# Patient Record
Sex: MALE | Race: OTHER | HISPANIC OR LATINO | URBAN - METROPOLITAN AREA
[De-identification: names, ages, dates, MRNs, and addresses within clinical notes are randomized per-mention and may not be internally consistent; named-entity substitution may affect disease eponyms.]

---

## 2020-07-09 ENCOUNTER — OUTPATIENT (OUTPATIENT)
Dept: OUTPATIENT SERVICES | Facility: HOSPITAL | Age: 40
LOS: 1 days | End: 2020-07-09

## 2020-07-10 LAB — SARS-COV-2 RNA SPEC QL NAA+PROBE: SIGNIFICANT CHANGE UP

## 2022-03-25 ENCOUNTER — EMERGENCY (EMERGENCY)
Facility: HOSPITAL | Age: 42
LOS: 1 days | Discharge: HOME | End: 2022-03-25
Attending: EMERGENCY MEDICINE | Admitting: EMERGENCY MEDICINE
Payer: MEDICAID

## 2022-03-25 VITALS
TEMPERATURE: 98 F | HEART RATE: 101 BPM | RESPIRATION RATE: 20 BRPM | OXYGEN SATURATION: 97 % | DIASTOLIC BLOOD PRESSURE: 66 MMHG | SYSTOLIC BLOOD PRESSURE: 123 MMHG

## 2022-03-25 VITALS
TEMPERATURE: 98 F | OXYGEN SATURATION: 99 % | HEART RATE: 98 BPM | SYSTOLIC BLOOD PRESSURE: 116 MMHG | DIASTOLIC BLOOD PRESSURE: 80 MMHG | RESPIRATION RATE: 20 BRPM

## 2022-03-25 PROCEDURE — 99284 EMERGENCY DEPT VISIT MOD MDM: CPT

## 2022-03-25 PROCEDURE — 71045 X-RAY EXAM CHEST 1 VIEW: CPT | Mod: 26

## 2022-03-25 RX ORDER — IPRATROPIUM/ALBUTEROL SULFATE 18-103MCG
3 AEROSOL WITH ADAPTER (GRAM) INHALATION
Refills: 0 | Status: COMPLETED | OUTPATIENT
Start: 2022-03-25 | End: 2022-03-25

## 2022-03-25 RX ORDER — MAGNESIUM SULFATE 500 MG/ML
2 VIAL (ML) INJECTION ONCE
Refills: 0 | Status: COMPLETED | OUTPATIENT
Start: 2022-03-25 | End: 2022-03-25

## 2022-03-25 RX ADMIN — Medication 50 MILLIGRAM(S): at 01:52

## 2022-03-25 RX ADMIN — Medication 3 MILLILITER(S): at 03:32

## 2022-03-25 RX ADMIN — Medication 200 MILLIGRAM(S): at 02:29

## 2022-03-25 RX ADMIN — Medication 150 GRAM(S): at 03:39

## 2022-03-25 RX ADMIN — Medication 3 MILLILITER(S): at 01:56

## 2022-03-25 RX ADMIN — Medication 3 MILLILITER(S): at 02:23

## 2022-03-25 NOTE — ED PROVIDER NOTE - PATIENT PORTAL LINK FT
You can access the FollowMyHealth Patient Portal offered by [facility] by registering at the following website: [email or web address]. By joining [employer]’s FollowMyHealth portal, you will also be able to view your health information using other applications (apps) compatible with our system.

## 2022-03-25 NOTE — ED PROVIDER NOTE - PHYSICAL EXAMINATION
PHYSICAL EXAM:  GENERAL: NAD, well-groomed, well-developed  HEAD:  Atraumatic, Normocephalic  EYES: EOMI, PERRLA, conjunctiva and sclera clear  ENMT: No tonsillar erythema, exudates, or enlargement; Moist mucous membranes, No lesions  NECK: Supple, No JVD, Normal thyroid  CHEST/LUNG: Diffuse inspiratory and expiratory wheezes  HEART: Regular rate and rhythm; No murmurs, rubs, or gallops  ABDOMEN: Soft, Nontender, Nondistended; Bowel sounds present  VASCULAR:  2+ Peripheral Pulses, No clubbing, cyanosis, or edema  LYMPH: No lymphadenopathy noted  SKIN: No rashes or lesions  NERVOUS SYSTEM:  Alert & Oriented X3; Motor Strength 5/5 B/L upper and lower extremities

## 2022-03-25 NOTE — ED PROVIDER NOTE - NSFOLLOWUPCLINICS_GEN_ALL_ED_FT
[facility] Pulmonolgy and Sleep Medicine  Pulmonology  [address] [place] [address], [place] [address]  Phone: [phone]  Fax:

## 2022-03-25 NOTE — ED PROVIDER NOTE - CLINICAL SUMMARY MEDICAL DECISION MAKING FREE TEXT BOX
42 M, current smoker, presenting with 2 weeks of intermittent cough and SOB. Patient with no formal diagnosis of asthma or COPD. No recent fevers, chills, runny nose, or sore throat. Patient able to speak in full sentences and saturating well. Vitals stable. On exam, diffuse inspiratory and expiratory wheezing auscultated. Will treat as asthma exacerbation possibly 2/2 viral illness. Will give duonebs and prednisone, check RVP and CXR, and then reassess.

## 2022-03-25 NOTE — ED ADULT TRIAGE NOTE - CHIEF COMPLAINT QUOTE
Patient experiencing SOB, wheezing, congestion and cough for 2 weeks. He was seen by his PCP and given an albuterol inhaler without relief. Patient was in L&D with his wife when he started feeling SOB, used his inhaler without relief. Respirations equal and unlabored. Speaking in full sentences.

## 2022-03-25 NOTE — ED PROVIDER NOTE - NSPTACCESSSVCSAPPTDETAILS_ED_ALL_ED_FT
Reactive airway with wheezing, cough, treated as asthma exacerbation. Active smoker. Not formerly diagnosed with asthma or COPD.

## 2022-03-25 NOTE — ED PROVIDER NOTE - NS ED ROS FT
REVIEW OF SYSTEMS:  CONSTITUTIONAL: No fever, chills, night sweats, or fatigue  EYES: No eye pain, visual disturbances, or discharge  ENMT:  No difficulty hearing, tinnitus, vertigo; No sinus or throat pain  NECK: No pain or stiffness  RESPIRATORY: + dry cough, + wheezing, + SOB when coughing. No hemoptysis  CARDIOVASCULAR: No chest pain, palpitations, dizziness, or leg swelling  GASTROINTESTINAL: No abdominal or epigastric pain. No nausea, vomiting, or hematemesis; No diarrhea or constipation. No melena or hematochezia.  GENITOURINARY: No dysuria, frequency, hematuria, or incontinence  NEUROLOGICAL: No headaches, memory loss, loss of strength, numbness, or tremors  SKIN: No itching, burning, rashes, or lesions   LYMPH NODES: No enlarged glands  MUSCULOSKELETAL: No joint pain or swelling; No muscle, back, or extremity pain  HEME/LYMPH: No easy bruising, or bleeding gums  ALLERGY AND IMMUNOLOGIC: No hives or eczema

## 2022-03-25 NOTE — ED PROVIDER NOTE - ATTENDING CONTRIBUTION TO CARE
I performed a face-to-face evaluation of the patient and performed a history and physical examination along with the resident or ACP, and/or medical student above.  I agree with the history and physical examination as documented by the resident or ACP, and/or medical student above.  Darrell:  41yo M, active daily smoker, denies significant pmh (specifically denies any formal dx of asthma), p/w 2wks intermittent coughing spells (each time a/w sob and chest pain), now a/w wheezing, mildly improved w/ albuterol MDI (recently prescribed by [facility]). Reports having tested negative for covid 4days ago. Denies f/c/sore throat/myalgias/abd pain. No hx of dvt/pe/LE swelling. No PE risk factors. CXR, meds, rvp, reassess.

## 2022-03-25 NOTE — ED ADULT NURSE NOTE - OBJECTIVE STATEMENT
Pt A&Ox4 ambulatory, no PMH, presenting to the ED (RM 20) c/o SOB. Pt states woke up from sleep having difficulty breathing. Pt states having intermittent cough and SOB for 2 weeks. Pt states SOB get worse at night. Endorses cough, HA. Pt states uses albuterol without relief. Denies cp, palpitations, n/v/d, fever, chills. Upon assessment, auditory wheezing noted, respirations are even and labored, able to speak in full sentences, coughing, O2= 99% RA, was put on NC 2L for comfort measures. pt placed on CM= MD MARCOS at bedside for evaluation, safety precautions implemented as per protocol, awaiting further MD orders, will continue to monitor.

## 2022-03-25 NOTE — ED PROVIDER NOTE - NSFOLLOWUPINSTRUCTIONS_ED_ALL_ED_FT
You came to the ED with Shortness of breath and cough. You were treated for an asthma attack.     Follow these instructions at home:  Medicines   •Take over-the-counter and prescription medicines only as told by your health care provider.   •Keep your medicines up-to-date.   •Make sure you have all of your medicines available at all times.  •If you were prescribed an antibiotic medicine, take it as told by your health care provider. Do not stop taking the antibiotic even if you start to feel better.    Avoiding triggers   •Keep track of things that trigger your asthma attacks. Avoid exposure to these triggers.  • Do not use any products that contain nicotine or tobacco, such as cigarettes, e-cigarettes, and chewing tobacco. If you need help quitting, ask your health care provider.  •When there is a lot of pollen, air pollution, or humidity, keep windows closed and use an air conditioner or go to places with air conditioning.      General instructions     •Avoid excessive exercise or activity until your asthma attack goes away. Ask your health care provider what activities are safe for you and when you can return to your normal activities.      •Stay up to date on all your vaccines, such as flu and pneumonia vaccines.      •Drink enough fluid to keep your urine pale yellow. Staying hydrated helps keep mucus in your lungs thin so it can be coughed up easily.      • Do not use alcohol until you have recovered.      •Keep all follow-up visits as told by your health care provider. This is important. Asthma requires careful medical care.        Contact a health care provider if:  •You have followed your action plan for 1 hour and your peak flow reading is still at 50–79%. This is in the yellow zone, which means "caution."  •You need to use your quick reliever medicine more frequently than normal.  •Your medicines are causing side effects, such as rash, itching, swelling, or trouble breathing.  •Your symptoms do not improve after 48 hours.  •You cough up mucus that is thicker than usual.  •You have a fever.    Get help right away if:  •Your peak flow reading is less than 50% of your personal best. This is in the red zone, which means "danger."  •You have trouble breathing.  •You develop chest pain or discomfort.  •Your medicines no longer seem to be helping.  •You are coughing up bloody mucus.  •You have a fever and your symptoms suddenly get worse.  •You have trouble swallowing.  •You feel very tired, and breathing becomes tiring.    Recommendations:  1. Please follow up with your primary care doctor  2. Please follow up with a pulmonologist.   3. Please continue to take prednisone 50mg once a day until complete (3 more days) You came to the ED with Shortness of breath and cough. You were treated for an asthma attack.     Follow these instructions at home:  Medicines   •Take over-the-counter and prescription medicines only as told by your health care provider.   •Keep your medicines up-to-date.   •Make sure you have all of your medicines available at all times.  •If you were prescribed an antibiotic medicine, take it as told by your health care provider. Do not stop taking the antibiotic even if you start to feel better.    Avoiding triggers   •Keep track of things that trigger your asthma attacks. Avoid exposure to these triggers.  • Do not use any products that contain nicotine or tobacco, such as cigarettes, e-cigarettes, and chewing tobacco. If you need help quitting, ask your health care provider.  •When there is a lot of pollen, air pollution, or humidity, keep windows closed and use an air conditioner or go to places with air conditioning.      General instructions     •Avoid excessive exercise or activity until your asthma attack goes away. Ask your health care provider what activities are safe for you and when you can return to your normal activities.      •Stay up to date on all your vaccines, such as flu and pneumonia vaccines.      •Drink enough fluid to keep your urine pale yellow. Staying hydrated helps keep mucus in your lungs thin so it can be coughed up easily.      • Do not use alcohol until you have recovered.      •Keep all follow-up visits as told by your health care provider. This is important. Asthma requires careful medical care.        Contact a health care provider if:  •You have followed your action plan for 1 hour and your peak flow reading is still at 50–79%. This is in the yellow zone, which means "caution."  •You need to use your quick reliever medicine more frequently than normal.  •Your medicines are causing side effects, such as rash, itching, swelling, or trouble breathing.  •Your symptoms do not improve after 48 hours.  •You cough up mucus that is thicker than usual.  •You have a fever.    Get help right away if:  •Your peak flow reading is less than 50% of your personal best. This is in the red zone, which means "danger."  •You have trouble breathing.  •You develop chest pain or discomfort.  •Your medicines no longer seem to be helping.  •You are coughing up bloody mucus.  •You have a fever and your symptoms suddenly get worse.  •You have trouble swallowing.  •You feel very tired, and breathing becomes tiring.    Recommendations:  1. Please follow up with your primary care doctor  2. Please continue to take prednisone 50mg once a day until complete (3 more days)  3. Please follow up with a pulmonologist. Please expect a phone call from the clinic to schedule an appointment. The clinic info is below:    [facility] Pulmonolgy and Sleep Medicine  Pulmonology  [address] [place] [address], [place] [address]  Phone: [phone]

## 2022-03-25 NOTE — ED PROVIDER NOTE - OBJECTIVE STATEMENT
42 M with no PMHx, but active smoker (4 cigarettes/day since age 17) presenting with 2 weeks of intermittent cough. During coughing episodes, patient feels SOB and endorses some chest pain. Has been prescribed an albuterol inhaler by urgent care which he has been using frequently for SOB. No formal diagnosis of asthma or COPD. Patient takes no other medications but has tried OTC benadryl, robitussin, and mucinex without significant relief. Per patient, he was recently tested for COVID 3-4 days ago and was negative. Cough is dry with minimal sputum production. Denies fever, chills, runny nose, sore throat.

## 2022-03-25 NOTE — ED PROVIDER NOTE - PROGRESS NOTE DETAILS
Joanna, PGY2: Patient still wheezing s/p 2 rounds of duonebs. Will give 3rd round and ordered IV mg 2g. Joanna, PGY2: Patient feels better after IV mg and 3rd duoneb. Will likely d/c with pulm follow up and 3 day course of prednisone.

## 2024-06-25 ENCOUNTER — HOSPITAL ENCOUNTER (EMERGENCY)
Facility: HOSPITAL | Age: 44
Discharge: HOME | End: 2024-06-25
Attending: EMERGENCY MEDICINE

## 2024-06-25 ENCOUNTER — APPOINTMENT (EMERGENCY)
Dept: ULTRASOUND IMAGING | Facility: HOSPITAL | Age: 44
End: 2024-06-25

## 2024-06-25 VITALS
OXYGEN SATURATION: 97 % | RESPIRATION RATE: 18 BRPM | SYSTOLIC BLOOD PRESSURE: 121 MMHG | DIASTOLIC BLOOD PRESSURE: 77 MMHG | TEMPERATURE: 97.9 F | HEART RATE: 81 BPM

## 2024-06-25 DIAGNOSIS — R10.11 RIGHT UPPER QUADRANT ABDOMINAL PAIN: Primary | ICD-10-CM

## 2024-06-25 LAB
ALBUMIN SERPL BCG-MCNC: 4.3 G/DL (ref 3.5–5)
ALP SERPL-CCNC: 43 U/L (ref 34–104)
ALT SERPL W P-5'-P-CCNC: 19 U/L (ref 7–52)
ANION GAP SERPL CALCULATED.3IONS-SCNC: 6 MMOL/L (ref 4–13)
AST SERPL W P-5'-P-CCNC: 44 U/L (ref 13–39)
BASOPHILS # BLD AUTO: 0.04 THOUSANDS/ÂΜL (ref 0–0.1)
BASOPHILS NFR BLD AUTO: 1 % (ref 0–1)
BILIRUB SERPL-MCNC: 0.36 MG/DL (ref 0.2–1)
BUN SERPL-MCNC: 16 MG/DL (ref 5–25)
CALCIUM SERPL-MCNC: 9.1 MG/DL (ref 8.4–10.2)
CHLORIDE SERPL-SCNC: 106 MMOL/L (ref 96–108)
CO2 SERPL-SCNC: 24 MMOL/L (ref 21–32)
CREAT SERPL-MCNC: 0.83 MG/DL (ref 0.6–1.3)
EOSINOPHIL # BLD AUTO: 0.39 THOUSAND/ÂΜL (ref 0–0.61)
EOSINOPHIL NFR BLD AUTO: 5 % (ref 0–6)
ERYTHROCYTE [DISTWIDTH] IN BLOOD BY AUTOMATED COUNT: 13.2 % (ref 11.6–15.1)
GFR SERPL CREATININE-BSD FRML MDRD: 106 ML/MIN/1.73SQ M
GLUCOSE SERPL-MCNC: 86 MG/DL (ref 65–140)
HCT VFR BLD AUTO: 43 % (ref 36.5–49.3)
HGB BLD-MCNC: 15.4 G/DL (ref 12–17)
IMM GRANULOCYTES # BLD AUTO: 0.02 THOUSAND/UL (ref 0–0.2)
IMM GRANULOCYTES NFR BLD AUTO: 0 % (ref 0–2)
LIPASE SERPL-CCNC: 21 U/L (ref 11–82)
LYMPHOCYTES # BLD AUTO: 2.02 THOUSANDS/ÂΜL (ref 0.6–4.47)
LYMPHOCYTES NFR BLD AUTO: 26 % (ref 14–44)
MCH RBC QN AUTO: 31.4 PG (ref 26.8–34.3)
MCHC RBC AUTO-ENTMCNC: 35.8 G/DL (ref 31.4–37.4)
MCV RBC AUTO: 88 FL (ref 82–98)
MONOCYTES # BLD AUTO: 0.71 THOUSAND/ÂΜL (ref 0.17–1.22)
MONOCYTES NFR BLD AUTO: 9 % (ref 4–12)
NEUTROPHILS # BLD AUTO: 4.53 THOUSANDS/ÂΜL (ref 1.85–7.62)
NEUTS SEG NFR BLD AUTO: 59 % (ref 43–75)
NRBC BLD AUTO-RTO: 0 /100 WBCS
PLATELET # BLD AUTO: 189 THOUSANDS/UL (ref 149–390)
PMV BLD AUTO: 11.5 FL (ref 8.9–12.7)
POTASSIUM SERPL-SCNC: 5 MMOL/L (ref 3.5–5.3)
PROT SERPL-MCNC: 7 G/DL (ref 6.4–8.4)
RBC # BLD AUTO: 4.91 MILLION/UL (ref 3.88–5.62)
SODIUM SERPL-SCNC: 136 MMOL/L (ref 135–147)
WBC # BLD AUTO: 7.71 THOUSAND/UL (ref 4.31–10.16)

## 2024-06-25 PROCEDURE — 85025 COMPLETE CBC W/AUTO DIFF WBC: CPT

## 2024-06-25 PROCEDURE — 83690 ASSAY OF LIPASE: CPT

## 2024-06-25 PROCEDURE — 76705 ECHO EXAM OF ABDOMEN: CPT

## 2024-06-25 PROCEDURE — 99284 EMERGENCY DEPT VISIT MOD MDM: CPT | Performed by: EMERGENCY MEDICINE

## 2024-06-25 PROCEDURE — 80053 COMPREHEN METABOLIC PANEL: CPT

## 2024-06-25 PROCEDURE — 36415 COLL VENOUS BLD VENIPUNCTURE: CPT

## 2024-06-25 RX ORDER — KETOROLAC TROMETHAMINE 30 MG/ML
15 INJECTION, SOLUTION INTRAMUSCULAR; INTRAVENOUS ONCE
Status: COMPLETED | OUTPATIENT
Start: 2024-06-25 | End: 2024-06-25

## 2024-06-25 RX ADMIN — KETOROLAC TROMETHAMINE 15 MG: 30 INJECTION, SOLUTION INTRAMUSCULAR; INTRAVENOUS at 18:47

## 2024-06-25 NOTE — ED NOTES
Patient transported to Ultrasound     Marlney Ferris RN  06/25/24 6234       Marleny Ferirs RN  06/25/24 1799

## 2024-06-25 NOTE — ED PROVIDER NOTES
History  Chief Complaint   Patient presents with    Abdominal Pain     Right side abdominal pain worsening over the past 4 days. Denies n/v/d     44-year-old male with no significant past medical or surgical history who presents for evaluation of right upper quadrant abdominal pain for the past 4 days.  The patient reports that his pain is intermittent, but he is unable to identify a clear trigger.  The patient reports that his pain is worse with movement when it comes on.  The patient is tolerating p.o. intake.  The patient has taken ibuprofen without improvement.  The patient denies fever, chills, chest pain, shortness of breath, nausea, vomiting, diarrhea, dysuria, hematuria.        None       History reviewed. No pertinent past medical history.    History reviewed. No pertinent surgical history.    History reviewed. No pertinent family history.  I have reviewed and agree with the history as documented.    E-Cigarette/Vaping     E-Cigarette/Vaping Substances     Social History     Tobacco Use    Smoking status: Every Day     Current packs/day: 0.50     Types: Cigarettes    Smokeless tobacco: Never   Substance Use Topics    Alcohol use: Never    Drug use: Never        Review of Systems   Constitutional:  Negative for chills and fever.   HENT:  Negative for congestion and sore throat.    Eyes:  Negative for pain and redness.   Respiratory:  Negative for cough and shortness of breath.    Cardiovascular:  Negative for chest pain and palpitations.   Gastrointestinal:  Positive for abdominal pain. Negative for blood in stool, diarrhea, nausea and vomiting.   Genitourinary:  Negative for dysuria, flank pain and hematuria.   Musculoskeletal:  Negative for arthralgias and myalgias.   Skin:  Negative for color change, pallor and rash.   Neurological:  Negative for syncope, weakness, light-headedness, numbness and headaches.   All other systems reviewed and are negative.      Physical Exam  ED Triage Vitals   Temperature  Pulse Respirations Blood Pressure SpO2   06/25/24 1810 06/25/24 1811 06/25/24 1811 06/25/24 1811 06/25/24 1811   97.9 °F (36.6 °C) 81 18 121/77 97 %      Temp Source Heart Rate Source Patient Position - Orthostatic VS BP Location FiO2 (%)   06/25/24 1810 06/25/24 1811 06/25/24 1811 06/25/24 1811 --   Oral Monitor Sitting Right arm       Pain Score       06/25/24 1811       10 - Worst Possible Pain             Orthostatic Vital Signs  Vitals:    06/25/24 1811   BP: 121/77   Pulse: 81   Patient Position - Orthostatic VS: Sitting       Physical Exam  Constitutional:       General: He is not in acute distress.     Appearance: Normal appearance. He is not ill-appearing, toxic-appearing or diaphoretic.   HENT:      Head: Normocephalic and atraumatic.      Nose: Nose normal.      Mouth/Throat:      Mouth: Mucous membranes are moist.      Pharynx: Oropharynx is clear.   Eyes:      General: No scleral icterus.     Conjunctiva/sclera: Conjunctivae normal.      Pupils: Pupils are equal, round, and reactive to light.   Cardiovascular:      Rate and Rhythm: Normal rate and regular rhythm.      Pulses: Normal pulses.      Heart sounds: Normal heart sounds. No murmur heard.     No friction rub. No gallop.   Pulmonary:      Effort: Pulmonary effort is normal.      Breath sounds: Normal breath sounds. No wheezing, rhonchi or rales.   Abdominal:      General: Abdomen is flat.      Palpations: Abdomen is soft.      Tenderness: There is abdominal tenderness in the right upper quadrant. There is no right CVA tenderness, left CVA tenderness, guarding or rebound. Positive signs include Patino's sign.   Musculoskeletal:         General: No swelling or tenderness. Normal range of motion.      Cervical back: Normal range of motion and neck supple. No rigidity or tenderness.      Right lower leg: No edema.      Left lower leg: No edema.   Lymphadenopathy:      Cervical: No cervical adenopathy.   Skin:     General: Skin is warm and dry.       Capillary Refill: Capillary refill takes less than 2 seconds.      Coloration: Skin is not jaundiced or pale.      Findings: No bruising, erythema, lesion or rash.   Neurological:      General: No focal deficit present.      Mental Status: He is alert and oriented to person, place, and time.      Sensory: No sensory deficit.      Motor: No weakness.         ED Medications  Medications   ketorolac (TORADOL) injection 15 mg (15 mg Intravenous Given 6/25/24 1847)       Diagnostic Studies  Results Reviewed       Procedure Component Value Units Date/Time    Comprehensive metabolic panel [[id number]]  (Abnormal) Collected: 06/25/24 1849    Lab Status: Final result Specimen: Blood from Arm, Right Updated: 06/25/24 1916     Sodium 136 mmol/L      Potassium 5.0 mmol/L      Chloride 106 mmol/L      CO2 24 mmol/L      ANION GAP 6 mmol/L      BUN 16 mg/dL      Creatinine 0.83 mg/dL      Glucose 86 mg/dL      Calcium 9.1 mg/dL      AST 44 U/L      ALT 19 U/L      Alkaline Phosphatase 43 U/L      Total Protein 7.0 g/dL      Albumin 4.3 g/dL      Total Bilirubin 0.36 mg/dL      eGFR 106 ml/min/1.73sq m     Narrative:      National Kidney Disease Foundation guidelines for Chronic Kidney Disease (CKD):     Stage 1 with normal or high GFR (GFR > 90 mL/min/1.73 square meters)    Stage 2 Mild CKD (GFR = 60-89 mL/min/1.73 square meters)    Stage 3A Moderate CKD (GFR = 45-59 mL/min/1.73 square meters)    Stage 3B Moderate CKD (GFR = 30-44 mL/min/1.73 square meters)    Stage 4 Severe CKD (GFR = 15-29 mL/min/1.73 square meters)    Stage 5 End Stage CKD (GFR <15 mL/min/1.73 square meters)  Note: GFR calculation is accurate only with a steady state creatinine    Lipase [[id number]]  (Normal) Collected: 06/25/24 1849    Lab Status: Final result Specimen: Blood from Arm, Right Updated: 06/25/24 1916     Lipase 21 u/L     CBC and differential [[id number]] Collected: 06/25/24 1849    Lab Status: Final result Specimen: Blood from Arm, Right Updated:  06/25/24 1854     WBC 7.71 Thousand/uL      RBC 4.91 Million/uL      Hemoglobin 15.4 g/dL      Hematocrit 43.0 %      MCV 88 fL      MCH 31.4 pg      MCHC 35.8 g/dL      RDW 13.2 %      MPV 11.5 fL      Platelets 189 Thousands/uL      nRBC 0 /100 WBCs      Segmented % 59 %      Immature Grans % 0 %      Lymphocytes % 26 %      Monocytes % 9 %      Eosinophils Relative 5 %      Basophils Relative 1 %      Absolute Neutrophils 4.53 Thousands/µL      Absolute Immature Grans 0.02 Thousand/uL      Absolute Lymphocytes 2.02 Thousands/µL      Absolute Monocytes 0.71 Thousand/µL      Eosinophils Absolute 0.39 Thousand/µL      Basophils Absolute 0.04 Thousands/µL                    US right upper quadrant   Final Result by Mor Perez MD (06/25 1943)      Gallbladder mildly contracted. No gallstones. Wall is mildly thickened at 3 mm. Positive sonographic Patino sign. If there is clinical concern for acalculous cholecystitis, consider nuclear medicine HIDA scan.         Workstation performed: [id number]               Procedures  Procedures      ED Course                             SBIRT 22yo+      Flowsheet Row Most Recent Value   Initial Alcohol Screen: US AUDIT-C     1. How often do you have a drink containing alcohol? 3 Filed at: 06/25/2024 1837   2. How many drinks containing alcohol do you have on a typical day you are drinking?  2 Filed at: 06/25/2024 1837   3a. Male UNDER 65: How often do you have five or more drinks on one occasion? 1 Filed at: 06/25/2024 1837   Audit-C Score 6 Filed at: 06/25/2024 1837   KALI: How many times in the past year have you...    Used an illegal drug or used a prescription medication for non-medical reasons? Never Filed at: 06/25/2024 1837                  Medical Decision Making  44-year-old male with no significant past medical or surgical history who presents for evaluation of intermittent right upper quadrant abdominal pain that has progressively worsened over the past 4 days.   Vitals are within the normal limits.  Physical exam is significant for focal right upper quadrant tenderness with positive Patino sign, but is otherwise unremarkable.  Differential diagnosis includes biliary pathology, musculoskeletal pain, pancreatitis.  Will order CBC, CMP, lipase, and right upper quadrant ultrasound.  Will treat the patient's pain with Toradol.    The patient reports some improvement in his pain after receiving Toradol.  Lab work is reviewed without actionable derangements.  Right upper quadrant ultrasound shows mildly contracted gallbladder without evidence of gallstones.  Given unremarkable lab work and ultrasound suspect the patient's pain is muscular in nature.  The patient is instructed to continue taking NSAIDs for his pain.  Return precautions are given and the patient is discharged.    Amount and/or Complexity of Data Reviewed  Labs: ordered.  Radiology: ordered.    Risk  Prescription drug management.          Disposition  Final diagnoses:   Right upper quadrant abdominal pain     Time reflects when diagnosis was documented in both MDM as applicable and the Disposition within this note       Time User Action Codes Description Comment    6/25/2024  8:12 PM Kristopher Puckett Add [R10.11] Right upper quadrant abdominal pain           ED Disposition       ED Disposition   Discharge    Condition   Stable    Date/Time   Tue Jun 25, 2024 2012    Comment   Eleazar Mcqueen discharge to home/self care.                   Follow-up Information       Follow up With Specialties Details Why Contact Info Additional Information    [facility] Emergency Department Emergency Medicine Go to  If symptoms worsen [address] [facility] [address]  [phone] [facility] Emergency Department, [address] [place], [place], [address]            There are no discharge medications for this patient.    No discharge procedures on file.    PDMP Review       None              ED Provider  Attending physically available and evaluated Eleazar Mcqueen. I managed the patient along with the ED Attending.    Electronically Signed by           Kristopher Puckett DO  06/25/24 3877

## 2024-06-25 NOTE — ED ATTENDING ATTESTATION
6/25/2024  IIsabella DO, saw and evaluated the patient. I have discussed the patient with the resident/non-physician practitioner and agree with the resident's/non-physician practitioner's findings, Plan of Care, and MDM as documented in the resident's/non-physician practitioner's note, except where noted. All available labs and Radiology studies were reviewed.  I was present for key portions of any procedure(s) performed by the resident/non-physician practitioner and I was immediately available to provide assistance.       At this point I agree with the current assessment done in the Emergency Department.  I have conducted an independent evaluation of this patient a history and physical is as follows:      A 44-year-old male with no significant past medical history; presents with right upper quadrant abdominal pain that has been present for the past 4 days.  Pain has been constant since onset.  Patient states pain is made worse by bending and lying flat.  He denies associated nausea, vomiting and diarrhea.  He is tolerating a regular diet.  Patient has otherwise not had fever, chills, chest pain, shortness of breath, dysuria, peripheral edema and rashes.    Physical Exam  General Appearance: alert and oriented, nad, non toxic appearing  Skin:  Warm, dry, intact  HEENT: atraumatic, normocephalic  Neck: Supple, trachea midline  Cardiac: RRR; no murmurs, rub, gallops  Pulmonary: lungs CTAB; no wheezes, rales, rhonchi  Gastrointestinal: abdomen soft, right upper quadrant tenderness, nondistended; no guarding or rebound tenderness; good bowel sounds, no mass or bruits  Extremities:  no pedal edema, 2+ pulses; no calf tenderness, no clubbing, no cyanosis  Neuro:  no focal motor or sensory deficits, CN 2-12 grossly intact  Psych:  Normal mood and affect, normal judgement and insight    Assessment and Plan:  Right upper quadrant abdominal pain, with reproducible tenderness.  History suggestive of musculoskeletal  etiology, however due to location of symptoms will check labs and right upper quadrant ultrasound to rule out hepatobiliary pathology.  Will treat symptomatically.       ED Course         Critical Care Time  Procedures

## 2024-06-26 NOTE — DISCHARGE INSTRUCTIONS
You were seen in the emergency department for right upper abdominal pain.  Your lab work and ultrasound did not show any concerning findings.  Your symptoms are most likely due to a strain of an abdominal muscle.  Take over-the-counter medications such as ibuprofen for your pain.  Refrain from strenuous activity.  If your pain worsens or you develop any new concerning symptoms such as fever or persistent vomiting please return to the emergency department.